# Patient Record
(demographics unavailable — no encounter records)

---

## 2017-12-13 NOTE — ER DOCUMENT REPORT
HPI





- HPI


Pain Level: 4


Context: 





36 yo male c/o low back pain.  fell in shower last night.  


Associated Symptoms: None


Exacerbated by: Movement


Relieved by: Denies


Similar symptoms previously: No


Recently seen / treated by doctor: No





- CONSTITUTIONAL


Constitutional: DENIES: Fever, Chills





- REPRODUCTIVE


Reproductive: DENIES: Pregnant:





Past Medical History





- General


Information source: Patient





- Social History


Smoking Status: Current Every Day Smoker


Chew tobacco use (# tins/day): No


Frequency of alcohol use: None


Drug Abuse: None


Lives with: Family


Family History: Reviewed & Not Pertinent, Other


Patient has suicidal ideation: No


Patient has homicidal ideation: No





- Past Medical History


Cardiac Medical History: Reports: Hx Hypertension


Endocrine Medical History: Reports: Hx Diabetes Mellitus Type 2


Renal/ Medical History: Denies: Hx Peritoneal Dialysis


Musculoskeltal Medical History: Reports Hx Arthritis


Psychiatric Medical History: Reports: Hx Depression





- Immunizations


Hx Diphtheria, Pertussis, Tetanus Vaccination: Yes - 2003





Vertical Provider Document





- CONSTITUTIONAL


Agree With Documented VS: Yes


General Appearance: WD/WN, Mild Distress - uncomfortable.  antalgic gait, Obese





- INFECTION CONTROL


TRAVEL OUTSIDE OF THE [place] IN LAST 30 DAYS: No





- HEENT


HEENT: Atraumatic, PERRLA





- NECK


Neck: Normal Inspection, Supple





- RESPIRATORY


Respiratory: Breath Sounds Normal, No Respiratory Distress


O2 Sat by Pulse Oximetry: 100





- CARDIOVASCULAR


Cardiovascular: Regular Rate, Regular Rhythm





- BACK


Back: Abnormal Inspection - + lumbar spinal and left paraspinal tenderness.  

neg SLT.  neg heel/toe





Course





- Re-evaluation


Re-evalutation: 





12/13/17 13:49


36 yo male presents with acute back pain without signs of spinal cord 

compression, cauda equina, infection, aneurysm or other serious etiology. xray 

isnegative for fracture.  shows DDD.  pt is independently and stadily 

ambulaotry withou paresthesia or neurologic deficits





- Vital Signs


Vital signs: 


 











Temp Pulse Resp BP Pulse Ox


 


 98.5 F   119 H  14   149/98 H  100 


 


 12/13/17 11:34  12/13/17 11:34  12/13/17 11:34  12/13/17 11:34  12/13/17 11:34














Discharge





- Discharge


Clinical Impression: 


Low back pain


Qualifiers:


 Chronicity: acute Back pain laterality: left Sciatica presence: without 

sciatica Qualified Code(s): M54.5 - Low back pain





Condition: Stable


Disposition: HOME, SELF-CARE


Instructions:  Low Back Pain ([facility]), Muscle Relaxers ([facility]), Oral Narcotic 

Medication ([facility]), Ibuprofen (General) ([facility])


Additional Instructions: 


your xrays are negative for fracture


take medications as prescribed


alternate ice/heat to sore area


follow up with your primary care if pain persists more than 10 days


Prescriptions: 


Hydrocodone/Acetaminophen [Norco 5-325 mg Tablet] 1 tab PO Q4H #12 tablet


Ibuprofen [Motrin 800 Mg Tablet] 800 mg PO Q6H #20 tablet


Methocarbamol [Robaxin 500 Mg Tablet] 1,000 mg PO Q6 #30 tablet


Forms:  Return to Work

## 2017-12-13 NOTE — RADIOLOGY REPORT (SQ)
EXAM DESCRIPTION:  L SPINE WHOLE



COMPLETED DATE/TIME:  12/13/2017 1:03 pm



REASON FOR STUDY:  fell in shower, low back pain



COMPARISON:  None.



NUMBER OF VIEWS:  Five views including obliques.



TECHNIQUE:  AP, lateral, oblique, and sacral radiographic images acquired of the lumbar spine.



LIMITATIONS:  None.



FINDINGS:  MINERALIZATION: Normal.

SEGMENTATION: Normal.  No transitional anatomy.

ALIGNMENT: Normal.

VERTEBRAE: Maintained height.  No fracture or worrisome bone lesion.

DISCS: There is narrowing the L1-2 disc with large marginal osteophytes.  There is narrowing of the L
5-S1 disc.

POSTERIOR ELEMENTS: Pedicles and facets are intact.  No pars defect or posterior arch defects.

HARDWARE: None in the spine.

PARASPINAL SOFT TISSUES: Normal.

PELVIS: Intact as visualized. No fractures or worrisome bone lesions. SI joints intact.

OTHER: No other significant finding.



IMPRESSION:  Degenerative disc disease with spondylosis.



TECHNICAL DOCUMENTATION:  JOB ID:  [id number]

 2011 [employer]- All Rights Reserved

## 2019-03-20 NOTE — RADIOLOGY REPORT (SQ)
EXAM DESCRIPTION:  CHEST SINGLE VIEW



COMPLETED DATE/TIME:  3/20/2019 8:18 am



REASON FOR STUDY:  gastritis, eval for air



COMPARISON:  8/30/2014



EXAM PARAMETERS:  NUMBER OF VIEWS: One view.

TECHNIQUE: Single frontal radiographic view of the chest acquired.

RADIATION DOSE: NA

LIMITATIONS: None.



FINDINGS:  LUNGS AND PLEURA: No opacities, masses or pneumothorax. No pleural effusion.

MEDIASTINUM AND HILAR STRUCTURES: No masses.  Contour normal.

HEART AND VASCULAR STRUCTURES: Heart normal in size.  Normal vasculature.

BONES: No acute findings.

HARDWARE: None in the chest.

OTHER: No other significant finding.



IMPRESSION:  No acute abnormality of the lungs in AP projection.  No subdiaphragmatic free air per st
ated study indication.



TECHNICAL DOCUMENTATION:  JOB ID:  [id number]

 2011 [employer]- All Rights Reserved



Reading location - IP/workstation name: [email or web address]

## 2019-03-20 NOTE — ER DOCUMENT REPORT
ED General





- General


Chief Complaint: Vomiting


Stated Complaint: STOMACH PAIN


Time Seen by Provider: 03/20/19 07:07


TRAVEL OUTSIDE OF THE [place] IN LAST 30 DAYS: No





- HPI


Notes: 





Patient is a 36-year-old gentleman comes in for evaluation.  He states he has 

had nausea and vomiting for the last several weeks.  He states he would have 4-6

episodes of emesis daily.  This morning starting about 5 AM he started having 

significant left upper quadrant abdominal pain.  It does not radiate.  Sharp and

burning.  He states he has had at least 10 episodes of emesis which he would 

characterize as bloody.  He states he did have some dark red emesis, he thought 

it may be blood.  He denies any melena.  He has had some loose stools.  He 

states he takes Rolaids and Maalox nearly daily for frequent heartburn.





- Related Data


Allergies/Adverse Reactions: 


                                        





No Known Allergies Allergy (Verified 03/20/19 06:54)


   











Past Medical History





- General


Information source: Patient





- Social History


Smoking Status: Current Every Day Smoker


Frequency of alcohol use: Occasional - States he drinks 3-4 beers at a time, 

once a week


Drug Abuse: None


Family History: CAD, Hypertension, Thyroid Disfunction





- Past Medical History


Cardiac Medical History: Reports: Hx Hypertension


Endocrine Medical History: Reports: Hx Diabetes Mellitus Type 2


Renal/ Medical History: Denies: Hx Peritoneal Dialysis


Musculoskeletal Medical History: Reports Hx Arthritis


Psychiatric Medical History: Reports: Hx Depression





- Immunizations


Hx Diphtheria, Pertussis, Tetanus Vaccination: Yes - 2003





Review of Systems





- Review of Systems


Constitutional: No symptoms reported


EENT: No symptoms reported


Cardiovascular: No symptoms reported


Respiratory: No symptoms reported


Gastrointestinal: See HPI


Genitourinary: No symptoms reported


Musculoskeletal: No symptoms reported


Skin: No symptoms reported


Neurological/Psychological: No symptoms reported





Physical Exam





- Vital signs


Vitals: 


                                        











Temp Pulse Resp BP Pulse Ox


 


 98.0 F   96   20   154/107 H  97 


 


 03/20/19 06:58  03/20/19 06:58  03/20/19 06:58  03/20/19 06:58  03/20/19 06:58














- Notes


Notes: 





Vital signs reviewed, please refer to chart.  Patient is normocephalic, 

atraumatic.  Pupils equal round, reactive to light.  Neck is supple without 

meningismus.  Heart is regular rate and rhythm.  Lungs are clear to auscultation

bilaterally.  Abdomen is soft, moderate left upper quadrant tenderness without 

rebound or guarding.  Extremities without cyanosis, clubbing, edema.  Peripheral

pulses are equal.  Skin is warm and dry.  Patient is awake, alert, neurological 

exam is nonfocal.  Rectal exam reveals no focal masses.  Stool is loose, brown, 

and heme-negative.





Course





- Re-evaluation


Re-evalutation: 





03/20/19 10:02


Patient presents emergency department for evaluation.  He is concerned that he 

has had hematemesis.  His laboratory investigations reveal no signs of elevated 

BUN, normal hemoglobin.  His rectal exam was negative.  Certainly he is at high 

risk for a significant gastritis.  I was also concerned about the possibility of

an ulcer, and explained that to the patient.  He will need gastroenterology 

follow-up.  Upright chest x-ray was ordered to rule out perforation, no signs of

free air.  Patient is feeling moderately improved here.  We will send him home 

with nausea medication and prescription for a PPI.  He currently does not have 

any medication coverage or insurance.


I did review this patient's visit in medical history.  At one point he had been 

a diabetic with high blood pressure and some alcohol dependence issues.  The 

patient has lost over 100 pounds.  He is off all of his medications.  He states 

he only drinks small amounts now.  His LFTs are unremarkable.


Patient was counseled on lifestyle modifications.  He is counseled to quit 

smoking, avoid spicy and acidic foods.  He is to minimize caffeine and alcohol 

intake.  He voiced understanding to this.  He remained stable throughout the 

course of his stay, had no further emesis.  We will discharge him home.





- Vital Signs


Vital signs: 


                                        











Temp Pulse Resp BP Pulse Ox


 


 98.0 F   96   12   149/92 H  98 


 


 03/20/19 06:58  03/20/19 06:58  03/20/19 09:01  03/20/19 09:01  03/20/19 09:01














- Laboratory


Result Diagrams: 


                                 03/20/19 07:29





                                 03/20/19 07:29


Laboratory results interpreted by me: 


                                        











  03/20/19





  07:29


 


Glucose  155 H














Discharge





- Discharge


Clinical Impression: 


 Gastritis, Hematemesis with nausea





Condition: Stable


Disposition: HOME, SELF-CARE


Instructions:  Acid-Suppressing Medication ([facility]), Upper Gastrointestinal 

Bleeding ([facility])


Additional Instructions: 


Gastritis





     You have an inflammation of the stomach called gastritis.  This commonly 

causes upper abdominal pain, nausea, and vomiting.  In severe cases, bleeding of

the stomach lining can occur.  Gastritis can be caused by bacteria or viruses, 

alcohol, or stomach-irritating drugs.


     Begin with sips of clear liquids.  Take increasing amounts of fluid over 

the first 24 hours.  Then start small amounts of bland foods (such as dry toast,

applesauce, mashed potato).  Gradually resume your usual diet.


     You should take antacids every two hours until the pain has subsided.  

Acid-suppressing drugs may be prescribed as well.  Avoid aspirin, caffeine, 

tobacco, and alcohol.


     If the abdominal pain worsens, or there is evidence of major bleeding in 

the stomach (such as black, tarry stool, bloody or black vomit, or 

lightheadedness), you should return immediately.  Call the doctor if you aren't 

improved in 24 to 36 hours.